# Patient Record
Sex: FEMALE | Race: WHITE
[De-identification: names, ages, dates, MRNs, and addresses within clinical notes are randomized per-mention and may not be internally consistent; named-entity substitution may affect disease eponyms.]

---

## 2022-01-03 ENCOUNTER — HOSPITAL ENCOUNTER (EMERGENCY)
Dept: HOSPITAL 75 - ER | Age: 22
Discharge: HOME | End: 2022-01-03
Payer: MEDICAID

## 2022-01-03 VITALS — SYSTOLIC BLOOD PRESSURE: 116 MMHG | DIASTOLIC BLOOD PRESSURE: 82 MMHG

## 2022-01-03 VITALS — WEIGHT: 139.99 LBS | HEIGHT: 67.72 IN | BODY MASS INDEX: 21.46 KG/M2

## 2022-01-03 DIAGNOSIS — Z20.822: ICD-10-CM

## 2022-01-03 DIAGNOSIS — Z11.3: ICD-10-CM

## 2022-01-03 DIAGNOSIS — F17.210: ICD-10-CM

## 2022-01-03 DIAGNOSIS — J06.9: Primary | ICD-10-CM

## 2022-01-03 LAB
ALBUMIN SERPL-MCNC: 4.3 GM/DL (ref 3.2–4.5)
ALP SERPL-CCNC: 70 U/L (ref 40–136)
ALT SERPL-CCNC: 110 U/L (ref 0–55)
APTT PPP: YELLOW S
BACTERIA #/AREA URNS HPF: NEGATIVE /HPF
BASOPHILS # BLD AUTO: 0 10^3/UL (ref 0–0.1)
BASOPHILS NFR BLD AUTO: 0 % (ref 0–10)
BILIRUB SERPL-MCNC: 0.3 MG/DL (ref 0.1–1)
BILIRUB UR QL STRIP: NEGATIVE
BUN/CREAT SERPL: 14
CALCIUM SERPL-MCNC: 9.6 MG/DL (ref 8.5–10.1)
CHLORIDE SERPL-SCNC: 107 MMOL/L (ref 98–107)
CO2 SERPL-SCNC: 21 MMOL/L (ref 21–32)
CREAT SERPL-MCNC: 0.73 MG/DL (ref 0.6–1.3)
EOSINOPHIL # BLD AUTO: 0.5 10^3/UL (ref 0–0.3)
EOSINOPHIL NFR BLD AUTO: 3 % (ref 0–10)
FIBRINOGEN PPP-MCNC: CLEAR MG/DL
GFR SERPLBLD BASED ON 1.73 SQ M-ARVRAT: 99 ML/MIN
GLUCOSE SERPL-MCNC: 77 MG/DL (ref 70–105)
GLUCOSE UR STRIP-MCNC: NEGATIVE MG/DL
HCT VFR BLD CALC: 44 % (ref 35–52)
HGB BLD-MCNC: 14.4 G/DL (ref 11.5–16)
KETONES UR QL STRIP: NEGATIVE
LEUKOCYTE ESTERASE UR QL STRIP: NEGATIVE
LYMPHOCYTES # BLD AUTO: 2.1 10^3/UL (ref 1–4)
LYMPHOCYTES NFR BLD AUTO: 16 % (ref 12–44)
MANUAL DIFFERENTIAL PERFORMED BLD QL: NO
MCH RBC QN AUTO: 31 PG (ref 25–34)
MCHC RBC AUTO-ENTMCNC: 33 G/DL (ref 32–36)
MCV RBC AUTO: 93 FL (ref 80–99)
MONOCYTES # BLD AUTO: 1.4 10^3/UL (ref 0–1)
MONOCYTES NFR BLD AUTO: 10 % (ref 0–12)
NEUTROPHILS # BLD AUTO: 9.6 10^3/UL (ref 1.8–7.8)
NEUTROPHILS NFR BLD AUTO: 70 % (ref 42–75)
NITRITE UR QL STRIP: NEGATIVE
PH UR STRIP: 6 [PH] (ref 5–9)
PLATELET # BLD: 301 10^3/UL (ref 130–400)
PMV BLD AUTO: 9.6 FL (ref 9–12.2)
POTASSIUM SERPL-SCNC: 3.5 MMOL/L (ref 3.6–5)
PROT SERPL-MCNC: 8.9 GM/DL (ref 6.4–8.2)
PROT UR QL STRIP: NEGATIVE
RBC #/AREA URNS HPF: (no result) /HPF
SODIUM SERPL-SCNC: 137 MMOL/L (ref 135–145)
SP GR UR STRIP: 1.01 (ref 1.02–1.02)
SQUAMOUS #/AREA URNS HPF: (no result) /HPF
WBC # BLD AUTO: 13.7 10^3/UL (ref 4.3–11)
WBC #/AREA URNS HPF: (no result) /HPF

## 2022-01-03 PROCEDURE — 87491 CHLMYD TRACH DNA AMP PROBE: CPT

## 2022-01-03 PROCEDURE — 87636 SARSCOV2 & INF A&B AMP PRB: CPT

## 2022-01-03 PROCEDURE — 99284 EMERGENCY DEPT VISIT MOD MDM: CPT

## 2022-01-03 PROCEDURE — 36415 COLL VENOUS BLD VENIPUNCTURE: CPT

## 2022-01-03 PROCEDURE — 87591 N.GONORRHOEAE DNA AMP PROB: CPT

## 2022-01-03 PROCEDURE — 87804 INFLUENZA ASSAY W/OPTIC: CPT

## 2022-01-03 PROCEDURE — 81000 URINALYSIS NONAUTO W/SCOPE: CPT

## 2022-01-03 PROCEDURE — 87635 SARS-COV-2 COVID-19 AMP PRB: CPT

## 2022-01-03 PROCEDURE — 85025 COMPLETE CBC W/AUTO DIFF WBC: CPT

## 2022-01-03 PROCEDURE — 80053 COMPREHEN METABOLIC PANEL: CPT

## 2022-01-03 PROCEDURE — 84703 CHORIONIC GONADOTROPIN ASSAY: CPT

## 2022-01-03 NOTE — ED COUGH/URI
General


Chief Complaint:  COVID19 Suspect/Confirmed


Stated Complaint:  BODY ACHES, LOSS OF TASTE/SMELL, EAR PAIN, CHILLS


Nursing Triage Note:  


PT AMB TO ER WITH C/O HEADACHE, FEVER, AND NAUSEA FOR A FEW DAYS


 (KELLY GILBERT)





History of Present Illness


Date Seen by Provider:  Thien 3, 2022


Time Seen by Provider:  18:20


Initial Comments


21 year old female presents with headache, sinus pressure, and intermittent 

nausea for approximately 1 week.  She is received her COVID vaccine in spring 

2021.  She did not receive a booster.  She has not had an influenza vaccine. She

does not have a PCP in [place], just moved her. 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

drug use, she went to rehab. She denies any drug use since rehab. 


In addition, she reports occ vaginal discharge and pain. She had chlamydia in 

the past, and was treated however her partner has not been.  She also reports 

that he has discharge and pain at times.  He has not been tested.


Timing/Duration:  just prior to arrival


Severity/Quality:  dry cough


Prior Episodes/Possible Cause:  no prior episodes


Modifying Factors:  Improves With Lying Down, Improves With Rest


Associated Symptoms:  cough, facial pain, fever/chills, nasal congestion, sinus 

infection


 (KELLY GILBERT)





Allergies and Home Medications


Allergies


Coded Allergies:  


     No Known Drug Allergies (Unverified , 1/3/22)





Patient Home Medication List


Home Medication List Reviewed:  Yes


 (KELLY GILBERT)


Doxycycline Hyclate (Doxycycline Hyclate) 100 Mg Tablet, 100 MG PO BID


   Prescribed by: KELLY GILBERT on 1/3/22 1930





Review of Systems


Review of Systems


Constitutional:  see HPI, fever, malaise


EENTM:  see HPI, nose congestion


Respiratory:  see HPI, cough (dry)


Cardiovascular:  no symptoms reported, see HPI


Gastrointestinal:  no symptoms reported, see HPI (KELLY GILBERT)





All Other Systems Reviewed


Negative Unless Noted:  Yes


 (KELLY GILBERT)





Past Medical-Social-Family Hx


Patient Social History


Tobacco Use?:  Yes


Tobacco type used:  Cigarettes


Smoking Status:  Current Everyday Smoker


Substance use?:  No


Additional substance use comme:  FORMER IV USER


Alcohol Use?:  No


Pt feels they are or have been:  No


 (KELLY GILBERT)





Immunizations Up To Date


Influenza Vaccine Up-to-Date:  No; Not Current


First/Initial COVID19 Vaccinat:  APRIL 2021


Second COVID19 Vaccination Trey:  MAY 2021


COVID19 Vaccine :  PFIZER


 (KELLY GILBERT)





Past Medical History


Surgery/Hospitalization HX:  


HEP C, HERPES,


Last Menstrual Period:  Dec 2, 2021


 (KELLY GILBERT)





Family Medical History


Reviewed Nursing Family Hx


 (KELLY GILBERT)





Physical Exam





Vital Signs - First Documented








 1/3/22





 18:20


 


Temp 37.1


 


Pulse 89


 


Resp 18


 


B/P (MAP) 117/84 (95)


 


Pulse Ox 98


 


O2 Delivery Room Air








 (LUCERO,MYRA K DO)


Capillary Refill :  


 (KELLY GILBERT)


Height: '"


Weight: lbs. oz. kg; 21.00 BMI


Method:


General Appearance:  WD/WN, no apparent distress


HEENT:  PERRL/EOMI, TMs normal, pharynx normal; No pharyngeal erythema, No 

tonsillar exudate; other (Mild frontal and maxillary sinus tenderness)


Neck:  non-tender, full range of motion, supple, normal inspection


Respiratory:  chest non-tender, lungs clear, normal breath sounds


Cardiovascular:  normal peripheral pulses, regular rate, rhythm, no edema


Gastrointestinal:  normal bowel sounds, non tender, soft


Extremities:  normal range of motion, non-tender, normal inspection, normal 

capillary refill


Neurologic/Psychiatric:  no motor/sensory deficits, alert, normal mood/affect, 

oriented x 3


Skin:  normal color, warm/dry (KELLY GILBERT)





Procedures/Interventions





   Wound Location:  Lower Extremities (Right knee, anterior)


   Wound Length (cm):  2.5


   Wound's Depth, Shape:  superficial


   Wound Explored:  clean


   Irrigated w/ Saline (ccs):  500


   Betadine Prep?:  Yes


   Anesthesia:  1% Lidocaine


   Volume Anesthetic (ccs):  4


   Wound Debrided:  minimal


   Suture:  Ethlion


   Suture Size:  4-0


   Number of Sutures:  3


   Sterile Dressing Applied?:  Yes


Progress


Patient tolerated procedure well, sterile bulky dressing applied.


 (KELLY GILBERT)





Progress/Results/Core Measures


Suspected Sepsis


SIRS


Temperature: 


Pulse: 89 


Respiratory Rate: 18


 


Laboratory Tests


1/3/22 18:50: White Blood Count 13.7H


Blood Pressure 117 /84 


Mean: 95


 


Laboratory Tests


1/3/22 18:50: 


Creatinine 0.73, Platelet Count 301, Total Bilirubin 0.3


 (KELLY GILBERT)





Results/Orders


Lab Results





Laboratory Tests








Test


 1/3/22


18:20 1/3/22


18:30 1/3/22


18:50 Range/Units


 


 


Urine Color YELLOW     


 


Urine Clarity CLEAR     


 


Urine pH 6.0    5-9  


 


Urine Specific Gravity 1.010 L   1.016-1.022  


 


Urine Protein NEGATIVE    NEGATIVE  


 


Urine Glucose (UA) NEGATIVE    NEGATIVE  


 


Urine Ketones NEGATIVE    NEGATIVE  


 


Urine Nitrite NEGATIVE    NEGATIVE  


 


Urine Bilirubin NEGATIVE    NEGATIVE  


 


Urine Urobilinogen 0.2    < = 1.0  MG/DL


 


Urine Leukocyte Esterase NEGATIVE    NEGATIVE  


 


Urine RBC (Auto) NEGATIVE    NEGATIVE  


 


Urine RBC NONE     /HPF


 


Urine WBC 2-5     /HPF


 


Urine Squamous Epithelial


Cells RARE 


 


 


  /HPF





 


Urine Crystals NONE     /LPF


 


Urine Bacteria NEGATIVE     /HPF


 


Urine Casts NONE     /LPF


 


Urine Mucus NEGATIVE     /LPF


 


Urine Culture Indicated NO     


 


Urine Chlamydia trachomatis


RNA Not Detected 


 


 


 Not Detected  





 


Urine Neisseria gonorrhoeae


RNA Not Detected 


 


 


 Not Detected  





 


Coronavirus (COVID-19)(PCR)  Negative   Negative  


 


Influenza Type A Antigen  NEGATIVE   NEGATIVE  


 


Influenza Type B Antigen  NEGATIVE   NEGATIVE  


 


SARS-CoV-2 RNA (RT-PCR)  Negative   Negative  


 


White Blood Count


 


 


 13.7 H


 4.3-11.0


10^3/uL


 


Red Blood Count


 


 


 4.66 


 3.80-5.11


10^6/uL


 


Hemoglobin   14.4  11.5-16.0  g/dL


 


Hematocrit   44  35-52  %


 


Mean Corpuscular Volume   93  80-99  fL


 


Mean Corpuscular Hemoglobin   31  25-34  pg


 


Mean Corpuscular Hemoglobin


Concent 


 


 33 


 32-36  g/dL





 


Red Cell Distribution Width   11.4  10.0-14.5  %


 


Platelet Count


 


 


 301 


 130-400


10^3/uL


 


Mean Platelet Volume   9.6  9.0-12.2  fL


 


Immature Granulocyte % (Auto)   0   %


 


Neutrophils (%) (Auto)   70  42-75  %


 


Lymphocytes (%) (Auto)   16  12-44  %


 


Monocytes (%) (Auto)   10  0-12  %


 


Eosinophils (%) (Auto)   3  0-10  %


 


Basophils (%) (Auto)   0  0-10  %


 


Neutrophils # (Auto)


 


 


 9.6 H


 1.8-7.8


10^3/uL


 


Lymphocytes # (Auto)


 


 


 2.1 


 1.0-4.0


10^3/uL


 


Monocytes # (Auto)


 


 


 1.4 H


 0.0-1.0


10^3/uL


 


Eosinophils # (Auto)


 


 


 0.5 H


 0.0-0.3


10^3/uL


 


Basophils # (Auto)


 


 


 0.0 


 0.0-0.1


10^3/uL


 


Immature Granulocyte # (Auto)


 


 


 0.0 


 0.0-0.1


10^3/uL


 


Sodium Level   137  135-145  MMOL/L


 


Potassium Level   3.5 L 3.6-5.0  MMOL/L


 


Chloride Level   107    MMOL/L


 


Carbon Dioxide Level   21  21-32  MMOL/L


 


Anion Gap   9  5-14  MMOL/L


 


Blood Urea Nitrogen   10  7-18  MG/DL


 


Creatinine


 


 


 0.73 


 0.60-1.30


MG/DL


 


Estimat Glomerular Filtration


Rate 


 


 99 


  





 


BUN/Creatinine Ratio   14   


 


Glucose Level   77    MG/DL


 


Calcium Level   9.6  8.5-10.1  MG/DL


 


Corrected Calcium   9.4  8.5-10.1  MG/DL


 


Total Bilirubin   0.3  0.1-1.0  MG/DL


 


Aspartate Amino Transf


(AST/SGOT) 


 


 49 H


 5-34  U/L





 


Alanine Aminotransferase


(ALT/SGPT) 


 


 110 H


 0-55  U/L





 


Alkaline Phosphatase   70    U/L


 


Total Protein   8.9 H 6.4-8.2  GM/DL


 


Albumin   4.3  3.2-4.5  GM/DL





 (LUCERO,MYRA K DO)


Vital Signs/I&O











 1/3/22 1/3/22





 18:20 19:57


 


Temp 37.1 37.1


 


Pulse 89 84


 


Resp 18 18


 


B/P (MAP) 117/84 (95) 116/82


 


Pulse Ox 98 98


 


O2 Delivery Room Air Room Air








 (LUCEROMYRA K DO)


Vital Signs/I&O


Capillary Refill :  


 (KELLY GILBERT)








Blood Pressure Mean:                    95











Departure


Impression





   Primary Impression:  


   URI (upper respiratory infection)


   Qualified Codes:  J06.9 - Acute upper respiratory infection, unspecified


   Additional Impressions:  


   Person under investigation for COVID-19


   Screening for STD (sexually transmitted disease)


Disposition:  01 HOME, SELF-CARE


Condition:  Improved





Departure-Patient Inst.


Decision time for Depature:  18:50


 (KELLY GILBERT)


Referrals:  


[facility]


Patient Instructions:  COVID-19 (DC), Chlamydia (DC), Gonorrhea (DC), Viral 

Upper Respiratory Infection, Adult (DC)





Add. Discharge Instructions:  


Establish care with Dr. Linda Nguyen at [facility], call for appt. 


No sexual intercourse until you complete antibiotics and your partner is treated

and finishes all medications. 


We will call you with the STD testing results. 


Take antibiotics as prescribed. 


Take an immune multivitamin that has vitamin C, D and zinc.


Increase water intake, 16 ounces every 2 hours while awake.


Alternate between Tylenol 650 mg and ibuprofen 600 mg every 4 hours for pain or 

fever.


Use Afrin nasal spray for 4 days and then discontinue, this will help with the 

sinus pressure.


You can alternate between DayQuil and NyQuil for respiratory symptoms.


You can go to [facility] Walk In care for non emergent medical concerns. 


We will notify you at the send out Covid test results.


Return to Emergency Dept for new, urgent healthcare needs.


Scripts


Doxycycline Hyclate (Doxycycline Hyclate) 100 Mg Tablet


100 MG PO BID, #14 TAB 0 Refills


   Prov: KELLY GILBERT         1/3/22








ATTENDING PHYSICIAN NOTE:


I WAS PHYSICALLY PRESENT AS ER PHYSICIAN WHEN THIS PATIENT WAS IN ER, BUT I WAS 

NOT INVOLVED IN ANY DECISION MAKING OR ANY CARE OF THIS PATIENT. 


 (MYRA BARKER DO)





Copy


Copies To 1:   LINDA NGUYEN MD, AMY ARNP                   Thien 3, 2022 19:10


MYRA BARKER DO                  Jan 7, 2022 06:17

## 2023-02-01 ENCOUNTER — HOSPITAL ENCOUNTER (EMERGENCY)
Dept: HOSPITAL 75 - ER | Age: 23
Discharge: HOME | End: 2023-02-01
Payer: COMMERCIAL

## 2023-02-01 VITALS — WEIGHT: 167.99 LBS | BODY MASS INDEX: 24.88 KG/M2 | HEIGHT: 69.02 IN

## 2023-02-01 VITALS — DIASTOLIC BLOOD PRESSURE: 70 MMHG | SYSTOLIC BLOOD PRESSURE: 117 MMHG

## 2023-02-01 DIAGNOSIS — Z3A.00: ICD-10-CM

## 2023-02-01 DIAGNOSIS — O20.0: Primary | ICD-10-CM

## 2023-02-01 PROCEDURE — 36415 COLL VENOUS BLD VENIPUNCTURE: CPT

## 2023-02-01 PROCEDURE — 86901 BLOOD TYPING SEROLOGIC RH(D): CPT

## 2023-02-01 PROCEDURE — 84702 CHORIONIC GONADOTROPIN TEST: CPT

## 2023-02-01 PROCEDURE — 86900 BLOOD TYPING SEROLOGIC ABO: CPT

## 2023-02-01 PROCEDURE — 99282 EMERGENCY DEPT VISIT SF MDM: CPT

## 2023-02-01 NOTE — ED GU-FEMALE
General


Chief Complaint:  OB < 20 WEEKS


Stated Complaint:  8-10 WEEKS PREGNANT | VAGINAL BLEEDING


Nursing Triage Note:  


PT AMB TO ED BY POV WITH C/O BLEEDING AND CRAMPING. PT IS APPROX 6 WEEKS 


PREGNANT, LMP DEC 20TH. ABD CRAMPING AND BLEEDING EQUIVALENT TO PT NORMAL PERIOD




BEGINNING LAST NIGHT. .


Source:  patient


Exam Limitations:  no limitations





History of Present Illness


Date Seen by Provider:  2023


Time Seen by Provider:  12:20


Initial Comments


Patient is a 22-year-old female who presents to the emergency department for 

evaluation of vaginal bleeding and cramping in the context of early pregnancy.  

Patient's LMP was the week before [place].  She states she began having some 

vaginal bleeding and lower abdominal cramping last night that patient states is 

similar to her normal menstrual pain.  She is a G4, P3 female.  She states she 

has had 3 healthy vaginal births in the past.  Denies any recent abdominal 

trauma.  States she has an appointment with her OB/GYN on February 10 for normal

checkup.  She states she called OB/GYN who referred her to the ER for further 

evaluation.





Allergies and Home Medications


Allergies


Coded Allergies:  


     No Known Drug Allergies (Unverified , 1/3/22)





Patient Home Medication List


Home Medication List Reviewed:  Yes


Doxycycline Hyclate (Doxycycline Hyclate) 100 Mg Tablet, 100 MG PO BID


   Prescribed by: KELLY GILBERT on 1/3/22 1930





Review of Systems


Review of Systems


Constitutional:  no symptoms reported


EENTM:  no symptoms reported


Respiratory:  no symptoms reported


Cardiovascular:  no symptoms reported


Gastrointestinal:  no symptoms reported


Genitourinary:  see HPI


Pregnant:  Yes


LMP:  Dec 21, 2022


Musculoskeletal:  no symptoms reported


Skin:  no symptoms reported


Psychiatric/Neurological:  No Symptoms Reported


Endocrine:  No Symptoms Reported





Past Medical-Social-Family Hx


Patient Social History


Tobacco Use?:  No


Use of E-Cig and/or Vaping dev:  No


Substance use?:  No


Alcohol Use?:  No


Pt feels they are or have been:  No





Immunizations Up To Date


Influenza Vaccine Up-to-Date:  No; Not Current


First/Initial COVID19 Vaccinat:  2021


Second COVID19 Vaccination Trey:  MAY 2021


Third COVID19 Vaccination Date:  2021





Past Medical History


Surgery/Hospitalization HX:  


HEP C, HERPES


Last Menstrual Period:  Dec 20, 2022





Physical Exam


Vital Signs





Vital Signs - First Documented








 23





 12:17


 


Temp 36.6


 


Pulse 68


 


Resp 18


 


B/P (MAP) 119/70 (86)


 


Pulse Ox 100


 


O2 Delivery Room Air





Capillary Refill : Less Than 3 Seconds


Height, Weight, BMI


Height: '"


Weight: lbs. oz. kg; 24.00 BMI


Method:


General Appearance:  WD/WN, no apparent distress


HEENT:  PERRL/EOMI, normal ENT inspection, TMs normal, pharynx normal


Neck:  non-tender, full range of motion, supple, normal inspection


Cardiovascular:  regular rate, rhythm


Respiratory:  chest non-tender, lungs clear, normal breath sounds, no 

respiratory distress, no accessory muscle use


Gastrointestinal:  normal bowel sounds, non tender, soft


Extremities:  normal range of motion


Neurologic/Psychiatric:  no motor/sensory deficits, alert, normal mood/affect, o

riented x 3


Skin:  normal color, warm/dry





Procedures/Interventions





   Suture Size:  4-0





Progress/Results/Core Measures


Suspected Sepsis


SIRS


Temperature: 


Pulse: 68 


Respiratory Rate: 18


 


Blood Pressure 119 /70 


Mean: 86





Results/Orders


Lab Results





Laboratory Tests








Test


 23


12:23 Range/Units


 


 


Human Chorionic Gonadotropin,


Quant [id number] H


 <5  MIU/ML











My Orders





Orders - YUSUFROGEPRISCILLA APRJAZMINE


Hcg,Quantitative (23 12:35)


Abo Rh Type (23 12:35)





Vital Signs/I&O











 23





 12:17


 


Temp 36.6


 


Pulse 68


 


Resp 18


 


B/P (MAP) 119/70 (86)


 


Pulse Ox 100


 


O2 Delivery Room Air





Capillary Refill : Less Than 3 Seconds








Blood Pressure Mean:                    86








Progress Note :  


Progress Note


Patient is nontoxic and well-hydrated on exam.  No adventitious lung sounds or 

increased work of breathing noted.  Abdominal exam is reassuring without 

distention/rigidity or focal provocation of pain with palpation.  Patient 

ambulatory to the room without issue.  Vital signs are reassuring.





Orders placed for quantitative hCG and ABO/Rh.





Quantitative hCG greater than 24,000.  This does correlate with the expected 

values given patient's estimated 6 to 7 weeks of gestation.  Patient is Rh+ 

meaning RhoGAM administration will not be necessary.  There is obvious concern 

for possible miscarriage given sudden onset of bleeding and abdominal cramping 

in early pregnancy however the work-up today does not conclusively rule in or 

out miscarriage.  Patient was encouraged to closely follow-up with OB/GYN for 

hCG recheck in 48 to 72 hours.  Return precautions for urgent symptomology 

discussed.  Patient verbalized understanding.





Departure


Impression





   Primary Impression:  


   Threatened miscarriage in early pregnancy


Disposition:  01 HOME, SELF-CARE


Condition:  Stable





Departure-Patient Inst.


Decision time for Depature:  14:05


Referrals:  


NO,LOCAL PHYSICIAN (PCP/Family)


Primary Care Physician


Patient Instructions:  Threatened Miscarriage (DC)





Add. Discharge Instructions:  


You need to follow-up with your OB/GYN in the next 2 to 3 days to have your 

pregnancy hormone checked again to see if it is continuing to go up or if it is 

going down.  This can further rule in or out miscarriage.





All discharge instructions reviewed with patient and/or family. Voiced 

understanding.











PRISCILLA YUSUF              2023 12:37

## 2023-02-03 ENCOUNTER — HOSPITAL ENCOUNTER (OUTPATIENT)
Dept: HOSPITAL 75 - LAB | Age: 23
End: 2023-02-03
Attending: NURSE PRACTITIONER
Payer: COMMERCIAL

## 2023-02-03 DIAGNOSIS — Z3A.00: ICD-10-CM

## 2023-02-03 DIAGNOSIS — O20.9: Primary | ICD-10-CM

## 2023-02-03 PROCEDURE — 84702 CHORIONIC GONADOTROPIN TEST: CPT

## 2023-02-03 PROCEDURE — 36415 COLL VENOUS BLD VENIPUNCTURE: CPT

## 2023-05-12 ENCOUNTER — HOSPITAL ENCOUNTER (OUTPATIENT)
Dept: HOSPITAL 75 - RAD | Age: 23
End: 2023-05-12
Attending: NURSE PRACTITIONER
Payer: COMMERCIAL

## 2023-05-12 DIAGNOSIS — Z34.02: Primary | ICD-10-CM

## 2023-05-12 DIAGNOSIS — Z3A.19: ICD-10-CM

## 2023-05-12 PROCEDURE — 76805 OB US >/= 14 WKS SNGL FETUS: CPT

## 2023-05-12 NOTE — DIAGNOSTIC IMAGING REPORT
INDICATION: Pregnant, anatomic survey, 20 weeks 3 days.



TECHNIQUE: Multiple real-time grayscale images were obtained over

the gravid uterus.



COMPARISON: None.



FINDINGS:



There is a single live intrauterine gestation in cephalic

presentation. The placenta is posterior without evidence of

previa. The cervix measures about 4.6 cm in length, and there is

no funneling seen. A four-chamber heart is seen. The stomach is

seen. The fetal heart rate measures 144 BPM. The kidneys and

bladder are seen. A three-vessel cord is demonstrated with two

umbilical arteries. The fetal cord insertion is seen. The upper

and lower spine is seen. The lateral ventricle is seen. The

cerebellum and cisterna magna are seen. The profile is seen. The

right and left ventricular outflow tracts are seen. The amniotic

fluid index measures 11.4 cm.



Biometrical measurements are as follows:

Biparietal 4.44 cm, age 19 weeks 3 days.

Head circumference 17.21 cm, age 19 weeks 6 days.

Abdominal circumference 14.31 cm, age 19 weeks 5 days.

Femur length 3.20 cm, age 20 weeks 0 days.



Sonographic estimate age: 19 weeks 6 days.

Sonographic estimated date of delivery: 09/30/2023.



Estimated Fetal Weight: 313 gm (+/- 11 gm).

LMP percentile: 16%.



Fetal heart rate: 144 beats per minute.



Fetal number: 1 of 1.





IMPRESSION: 

1. Single live intrauterine gestation measuring at 19 weeks 6

days which is within range of the clinical dates.

2. Anatomic survey. No abnormality identified.



Dictated by: 



  Dictated on workstation # [id number]

## 2023-09-10 ENCOUNTER — HOSPITAL ENCOUNTER (EMERGENCY)
Dept: HOSPITAL 75 - ER | Age: 23
Discharge: HOME | End: 2023-09-10
Payer: COMMERCIAL

## 2023-09-10 VITALS — BODY MASS INDEX: 26.68 KG/M2 | HEIGHT: 66.97 IN | WEIGHT: 169.98 LBS

## 2023-09-10 VITALS — SYSTOLIC BLOOD PRESSURE: 106 MMHG | DIASTOLIC BLOOD PRESSURE: 72 MMHG

## 2023-09-10 DIAGNOSIS — Z3A.38: ICD-10-CM

## 2023-09-10 DIAGNOSIS — A08.4: ICD-10-CM

## 2023-09-10 DIAGNOSIS — O99.613: Primary | ICD-10-CM

## 2023-09-10 DIAGNOSIS — Z20.822: ICD-10-CM

## 2023-09-10 LAB
ALBUMIN SERPL-MCNC: 3.5 GM/DL (ref 3.2–4.5)
ALP SERPL-CCNC: 149 U/L (ref 40–136)
ALT SERPL-CCNC: 10 U/L (ref 0–55)
APTT PPP: YELLOW S
BACTERIA #/AREA URNS HPF: (no result) /HPF
BASOPHILS # BLD AUTO: 0 10^3/UL (ref 0–0.1)
BASOPHILS NFR BLD AUTO: 0 % (ref 0–10)
BILIRUB SERPL-MCNC: 0.3 MG/DL (ref 0.1–1)
BILIRUB UR QL STRIP: NEGATIVE
BUN/CREAT SERPL: 14
CALCIUM SERPL-MCNC: 8.3 MG/DL (ref 8.5–10.1)
CHLORIDE SERPL-SCNC: 109 MMOL/L (ref 98–107)
CO2 SERPL-SCNC: 18 MMOL/L (ref 21–32)
CREAT SERPL-MCNC: 0.81 MG/DL (ref 0.6–1.3)
EOSINOPHIL # BLD AUTO: 0.1 10^3/UL (ref 0–0.3)
EOSINOPHIL NFR BLD AUTO: 1 % (ref 0–10)
FIBRINOGEN PPP-MCNC: CLEAR MG/DL
GFR SERPLBLD BASED ON 1.73 SQ M-ARVRAT: 103 ML/MIN
GLUCOSE SERPL-MCNC: 79 MG/DL (ref 70–105)
GLUCOSE UR STRIP-MCNC: NEGATIVE MG/DL
HCT VFR BLD CALC: 37 % (ref 35–52)
HGB BLD-MCNC: 13 G/DL (ref 11.5–16)
KETONES UR QL STRIP: NEGATIVE
LEUKOCYTE ESTERASE UR QL STRIP: NEGATIVE
LYMPHOCYTES # BLD AUTO: 1.3 10^3/UL (ref 1–4)
LYMPHOCYTES NFR BLD AUTO: 16 % (ref 12–44)
MANUAL DIFFERENTIAL PERFORMED BLD QL: NO
MCH RBC QN AUTO: 32 PG (ref 25–34)
MCHC RBC AUTO-ENTMCNC: 35 G/DL (ref 32–36)
MCV RBC AUTO: 92 FL (ref 80–99)
MONOCYTES # BLD AUTO: 0.9 10^3/UL (ref 0–1)
MONOCYTES NFR BLD AUTO: 12 % (ref 0–12)
NEUTROPHILS # BLD AUTO: 5.4 10^3/UL (ref 1.8–7.8)
NEUTROPHILS NFR BLD AUTO: 71 % (ref 42–75)
NITRITE UR QL STRIP: NEGATIVE
PH UR STRIP: 5.5 [PH] (ref 5–9)
PLATELET # BLD: 250 10^3/UL (ref 130–400)
PMV BLD AUTO: 10.6 FL (ref 9–12.2)
POTASSIUM SERPL-SCNC: 3.3 MMOL/L (ref 3.6–5)
PROT SERPL-MCNC: 7.2 GM/DL (ref 6.4–8.2)
PROT UR QL STRIP: (no result)
RBC #/AREA URNS HPF: (no result) /HPF
SODIUM SERPL-SCNC: 138 MMOL/L (ref 135–145)
SP GR UR STRIP: >=1.03 (ref 1.02–1.02)
SQUAMOUS #/AREA URNS HPF: (no result) /HPF
WBC # BLD AUTO: 7.7 10^3/UL (ref 4.3–11)
WBC #/AREA URNS HPF: (no result) /HPF

## 2023-09-10 PROCEDURE — 81000 URINALYSIS NONAUTO W/SCOPE: CPT

## 2023-09-10 PROCEDURE — 87636 SARSCOV2 & INF A&B AMP PRB: CPT

## 2023-09-10 PROCEDURE — 85025 COMPLETE CBC W/AUTO DIFF WBC: CPT

## 2023-09-10 PROCEDURE — 87088 URINE BACTERIA CULTURE: CPT

## 2023-09-10 PROCEDURE — 80053 COMPREHEN METABOLIC PANEL: CPT

## 2023-09-10 PROCEDURE — 36415 COLL VENOUS BLD VENIPUNCTURE: CPT

## 2023-09-10 NOTE — ED GI
General


Chief Complaint:  Abdominal/GI Problems


Stated Complaint:  VOMITING/DIARRHEA


Source of Information:  Patient


Exam Limitations:  No Limitations





History of Present Illness


Date Seen by Provider:  Sep 10, 2023


Time Seen by Provider:  13:44


Initial Comments


23-year-old female presents to the ER with reports of 4 days of vomiting, and di

arrhea, and body aches.  She also thinks she may have a low-grade temperature.  

She states that she has vomited once a day for the last 4 days, and has had 

several episodes of diarrhea every day.  She reports that her  has 

similar symptoms.  She denies fevers, abdominal pain, dysuria.  Patient is 

approximately 38 weeks pregnant, G4, P3.  She denies any abnormal vaginal bleed

ing or discharge.  She states she is mostly worried that she may have COVID or 

the flu.





Allergies and Home Medications


Allergies


Coded Allergies:  


     No Known Drug Allergies (Unverified , 1/3/22)





Patient Home Medication List


Home Medication List Reviewed:  Yes


Doxycycline Hyclate (Doxycycline Hyclate) 100 Mg Tablet, 100 MG PO BID


   Prescribed by: KELLY GILBERT on 1/3/22 1930


Ondansetron (Ondansetron Odt) 4 Mg Tab.rapdis, 4 MG SL Q4H PRN for 

NAUSEA/VOMITING


   Prescribed by: Elyse Miller on 9/10/23 1550





Review of Systems


Review of Systems


Constitutional:  see HPI





Past Medical-Social-Family Hx


Immunizations Up To Date


First/Initial COVID19 Vaccinat:  APRIL 2021


Second COVID19 Vaccination Trey:  MAY 2021


Third COVID19 Vaccination Date:  APRIL 2021





Past Medical History


Surgery/Hospitalization HX:  


HEP C, HERPES





Physical Exam


Vital Signs





Vital Signs - First Documented








 9/10/23





 13:45


 


Temp 36.8


 


Pulse 74


 


Resp 16


 


B/P (MAP) 100/70 (80)


 


Pulse Ox 96


 


O2 Delivery Room Air





Capillary Refill :


Height/Weight/BMI


Height: '"


Weight: lbs. oz. kg; 24.00 BMI


Method:


General Appearance:  WD/WN, no apparent distress


Neck:  supple, normal inspection


Respiratory:  lungs clear, normal breath sounds, no respiratory distress, no 

accessory muscle use


Cardiovascular:  regular rate, rhythm


Extremities:  normal range of motion, normal inspection


Neurologic/Psychiatric:  alert, normal mood/affect


Skin:  normal color, warm/dry





Procedures/Interventions





   Suture Size:  4-0





Progress/Results/Core Measures


Results/Orders


Lab Results





Laboratory Tests








Test


 9/10/23


13:58 9/10/23


14:13 9/10/23


15:02 Range/Units


 


 


White Blood Count


 7.7 


 


 


 4.3-11.0


10^3/uL


 


Red Blood Count


 4.05 


 


 


 3.80-5.11


10^6/uL


 


Hemoglobin 13.0    11.5-16.0  g/dL


 


Hematocrit 37    35-52  %


 


Mean Corpuscular Volume 92    80-99  fL


 


Mean Corpuscular Hemoglobin 32    25-34  pg


 


Mean Corpuscular Hemoglobin


Concent 35 


 


 


 32-36  g/dL





 


Red Cell Distribution Width 11.9    10.0-14.5  %


 


Platelet Count


 250 


 


 


 130-400


10^3/uL


 


Mean Platelet Volume 10.6    9.0-12.2  fL


 


Immature Granulocyte % (Auto) 0     %


 


Neutrophils (%) (Auto) 71    42-75  %


 


Lymphocytes (%) (Auto) 16    12-44  %


 


Monocytes (%) (Auto) 12    0-12  %


 


Eosinophils (%) (Auto) 1    0-10  %


 


Basophils (%) (Auto) 0    0-10  %


 


Neutrophils # (Auto)


 5.4 


 


 


 1.8-7.8


10^3/uL


 


Lymphocytes # (Auto)


 1.3 


 


 


 1.0-4.0


10^3/uL


 


Monocytes # (Auto)


 0.9 


 


 


 0.0-1.0


10^3/uL


 


Eosinophils # (Auto)


 0.1 


 


 


 0.0-0.3


10^3/uL


 


Basophils # (Auto)


 0.0 


 


 


 0.0-0.1


10^3/uL


 


Immature Granulocyte # (Auto)


 0.0 


 


 


 0.0-0.1


10^3/uL


 


Sodium Level 138    135-145  MMOL/L


 


Potassium Level 3.3 L   3.6-5.0  MMOL/L


 


Chloride Level 109 H     MMOL/L


 


Carbon Dioxide Level 18 L   21-32  MMOL/L


 


Anion Gap 11    5-14  MMOL/L


 


Blood Urea Nitrogen 11    7-18  MG/DL


 


Creatinine


 0.81 


 


 


 0.60-1.30


MG/DL


 


Estimat Glomerular Filtration


Rate 103 


 


 


  





 


BUN/Creatinine Ratio 14     


 


Glucose Level 79      MG/DL


 


Calcium Level 8.3 L   8.5-10.1  MG/DL


 


Corrected Calcium 8.7    8.5-10.1  MG/DL


 


Total Bilirubin 0.3    0.1-1.0  MG/DL


 


Aspartate Amino Transf


(AST/SGOT) 25 


 


 


 5-34  U/L





 


Alanine Aminotransferase


(ALT/SGPT) 10 


 


 


 0-55  U/L





 


Alkaline Phosphatase 149 H     U/L


 


Total Protein 7.2    6.4-8.2  GM/DL


 


Albumin 3.5    3.2-4.5  GM/DL


 


Influenza Type A (RT-PCR)  Not Detected   Not Detecte  


 


Influenza Type B (RT-PCR)  Not Detected   Not Detecte  


 


SARS-CoV-2 RNA (RT-PCR)  Not Detected   Not Detecte  


 


Urine Color   YELLOW   


 


Urine Clarity   CLEAR   


 


Urine pH   5.5  5-9  


 


Urine Specific Gravity   >=1.030  1.016-1.022  


 


Urine Protein   1+ H NEGATIVE  


 


Urine Glucose (UA)   NEGATIVE  NEGATIVE  


 


Urine Ketones   NEGATIVE  NEGATIVE  


 


Urine Nitrite   NEGATIVE  NEGATIVE  


 


Urine Bilirubin   NEGATIVE  NEGATIVE  


 


Urine Urobilinogen   2.0  < = 1.0  MG/DL


 


Urine Leukocyte Esterase   NEGATIVE  NEGATIVE  


 


Urine RBC (Auto)   NEGATIVE  NEGATIVE  


 


Urine RBC   NONE   /HPF


 


Urine WBC   0-2   /HPF


 


Urine Squamous Epithelial


Cells 


 


 5-10 


  /HPF





 


Urine Crystals   NONE   /LPF


 


Urine Bacteria   FEW H  /HPF


 


Urine Casts   NONE   /LPF


 


Urine Mucus   SMALL H  /LPF


 


Urine Culture Indicated   YES   








My Orders





Orders - ELYSE FABIAN APRN


Ua Culture If Indicated (9/10/23 13:43)


Comprehensive Metabolic Panel (9/10/23 13:48)


Ed Iv/Invasive Line Start (9/10/23 13:48)


Cbc With Automated Diff (9/10/23 13:48)


Ns Iv 1000 Ml (Ns Iv 1000 Ml) (9/10/23 14:00)


Ondansetron Injection (Ondansetron  Inj (9/10/23 14:00)


Fetal Heart Tones (9/10/23 14:17)


Covid 19 Inhouse Test (9/10/23 14:23)


Influenza A And B By Pcr (9/10/23 14:23)


Ns Iv 1000 Ml (Ns Iv 1000 Ml) (9/10/23 15:00)


Urine Culture (9/10/23 15:02)





Medications Given in ED





Current Medications








 Medications  Dose


 Ordered  Sig/Candace


 Route  Start Time


 Stop Time Status Last Admin


Dose Admin


 


 Ondansetron HCl  4 mg  ONCE  ONCE


 IVP  9/10/23 14:00


 9/10/23 14:01 DC 9/10/23 14:10


4 MG








Vital Signs/I&O











 9/10/23 9/10/23





 13:45 15:55


 


Temp 36.8 


 


Pulse 74 63


 


Resp 16 16


 


B/P (MAP) 100/70 (80) 106/72


 


Pulse Ox 96 99


 


O2 Delivery Room Air Room Air











Progress


Progress Note :  


Progress Note


Patient seen and evaluated, resting comfortably in bed, no acute distress.  On 

exam and symptoms, work-up initially included CBC, CMP, UA, COVID, flu swabs.  

IV fluids and Zofran ordered.





1548 Labs reviewed. CBC grossly normal.  CMP shows slightly elevated chloride 

108, slight decreased CO2 18, slightly decreased potassium 3.3.  COVID and flu 

negative.  Urinalysis shows elevated urine specific gravity 1.030.  Shows n

egative leukocytes, negative nitrites, 0-2 WBCs, 5-10 squamous epithelial cells,

few bacteria.  Specimen appears contaminated.  Patient appears slightly 

dehydrated based on labs, second liter of fluid was administered.  Results 

discussed with patient.  This is likely a viral gastroenteritis.  Fetal heart 

tones were completed and normal.  Patient declines any abdominal pain or cramp

ing.  I do not think that patient needs to be seen by OB as well.  Discharge 

instructions and return precautions provided.





Departure


Impression





   Primary Impression:  


   Viral gastroenteritis


Disposition:  01 HOME, SELF-CARE


Condition:  Stable





Departure-Patient Inst.


Decision time for Depature:  15:48


Referrals:  


JAN GEORGE DO (PCP)


Primary Care Physician








NO,LOCAL PHYSICIAN (Family)


Primary Care Physician


Patient Instructions:  Viral gastroenteritis in adults





Add. Discharge Instructions:  


Make sure you are staying hydrated.  Make sure you are drinking plenty of water.

 You may add Liquid IV or other electrolyte powder to your water to help with 

hydration and to increase your potassium intake.


Take Zofran as needed for nausea and vomiting, it can cause constipation.


Follow-up with your OB/GYN.


Return for any new, concerning, or worsening symptoms.





All discharge instructions reviewed with patient and/or family. Voiced 

understanding.


Scripts


Ondansetron (Ondansetron Odt) 4 Mg Tab.rapdis


4 MG SL Q4H PRN for NAUSEA/VOMITING, #10 TAB 0 Refills


   Prov: ELYSE FABIAN         9/10/23











ELYSE FABIAN          Sep 10, 2023 13:52